# Patient Record
Sex: MALE | Race: WHITE | Employment: FULL TIME | URBAN - METROPOLITAN AREA
[De-identification: names, ages, dates, MRNs, and addresses within clinical notes are randomized per-mention and may not be internally consistent; named-entity substitution may affect disease eponyms.]

---

## 2020-08-03 ENCOUNTER — APPOINTMENT (OUTPATIENT)
Dept: GENERAL RADIOLOGY | Facility: CLINIC | Age: 55
End: 2020-08-03
Attending: EMERGENCY MEDICINE
Payer: COMMERCIAL

## 2020-08-03 ENCOUNTER — HOSPITAL ENCOUNTER (EMERGENCY)
Facility: CLINIC | Age: 55
Discharge: HOME | End: 2020-08-03
Attending: EMERGENCY MEDICINE | Admitting: EMERGENCY MEDICINE
Payer: COMMERCIAL

## 2020-08-03 VITALS
HEART RATE: 71 BPM | WEIGHT: 196.21 LBS | DIASTOLIC BLOOD PRESSURE: 105 MMHG | RESPIRATION RATE: 16 BRPM | SYSTOLIC BLOOD PRESSURE: 156 MMHG | HEIGHT: 71 IN | TEMPERATURE: 98.9 F | BODY MASS INDEX: 27.47 KG/M2 | OXYGEN SATURATION: 100 %

## 2020-08-03 DIAGNOSIS — R20.2 BILATERAL NUMBNESS AND TINGLING OF ARMS AND LEGS: ICD-10-CM

## 2020-08-03 DIAGNOSIS — R20.2 PARESTHESIAS: Primary | ICD-10-CM

## 2020-08-03 DIAGNOSIS — R20.0 BILATERAL NUMBNESS AND TINGLING OF ARMS AND LEGS: ICD-10-CM

## 2020-08-03 DIAGNOSIS — F41.9 ANXIETY: ICD-10-CM

## 2020-08-03 LAB
ANION GAP SERPL CALCULATED.3IONS-SCNC: 7 MMOL/L (ref 3–14)
BASOPHILS # BLD AUTO: 0.1 10E9/L (ref 0–0.2)
BASOPHILS NFR BLD AUTO: 0.5 %
BUN SERPL-MCNC: 16 MG/DL (ref 7–30)
CALCIUM SERPL-MCNC: 8.9 MG/DL (ref 8.5–10.1)
CHLORIDE SERPL-SCNC: 106 MMOL/L (ref 94–109)
CO2 SERPL-SCNC: 26 MMOL/L (ref 20–32)
CREAT SERPL-MCNC: 0.98 MG/DL (ref 0.66–1.25)
D DIMER PPP FEU-MCNC: 0.3 UG/ML FEU (ref 0–0.5)
DIFFERENTIAL METHOD BLD: ABNORMAL
EOSINOPHIL # BLD AUTO: 0 10E9/L (ref 0–0.7)
EOSINOPHIL NFR BLD AUTO: 0.4 %
ERYTHROCYTE [DISTWIDTH] IN BLOOD BY AUTOMATED COUNT: 12.5 % (ref 10–15)
GFR SERPL CREATININE-BSD FRML MDRD: 87 ML/MIN/{1.73_M2}
GLUCOSE SERPL-MCNC: 100 MG/DL (ref 70–99)
HCT VFR BLD AUTO: 42.7 % (ref 40–53)
HGB BLD-MCNC: 14.8 G/DL (ref 13.3–17.7)
IMM GRANULOCYTES # BLD: 0 10E9/L (ref 0–0.4)
IMM GRANULOCYTES NFR BLD: 0.3 %
INTERPRETATION ECG - MUSE: NORMAL
LYMPHOCYTES # BLD AUTO: 1.5 10E9/L (ref 0.8–5.3)
LYMPHOCYTES NFR BLD AUTO: 13.8 %
MCH RBC QN AUTO: 30.1 PG (ref 26.5–33)
MCHC RBC AUTO-ENTMCNC: 34.7 G/DL (ref 31.5–36.5)
MCV RBC AUTO: 87 FL (ref 78–100)
MONOCYTES # BLD AUTO: 1.4 10E9/L (ref 0–1.3)
MONOCYTES NFR BLD AUTO: 12.8 %
NEUTROPHILS # BLD AUTO: 8.1 10E9/L (ref 1.6–8.3)
NEUTROPHILS NFR BLD AUTO: 72.2 %
NRBC # BLD AUTO: 0 10*3/UL
NRBC BLD AUTO-RTO: 0 /100
PLATELET # BLD AUTO: 266 10E9/L (ref 150–450)
POTASSIUM SERPL-SCNC: 3.9 MMOL/L (ref 3.4–5.3)
RBC # BLD AUTO: 4.92 10E12/L (ref 4.4–5.9)
SODIUM SERPL-SCNC: 139 MMOL/L (ref 133–144)
TROPONIN I SERPL-MCNC: <0.015 UG/L (ref 0–0.04)
TSH SERPL DL<=0.005 MIU/L-ACNC: 1.08 MU/L (ref 0.4–4)
WBC # BLD AUTO: 11.2 10E9/L (ref 4–11)

## 2020-08-03 PROCEDURE — 25800030 ZZH RX IP 258 OP 636: Performed by: EMERGENCY MEDICINE

## 2020-08-03 PROCEDURE — 85379 FIBRIN DEGRADATION QUANT: CPT | Performed by: EMERGENCY MEDICINE

## 2020-08-03 PROCEDURE — 84484 ASSAY OF TROPONIN QUANT: CPT | Performed by: EMERGENCY MEDICINE

## 2020-08-03 PROCEDURE — 80048 BASIC METABOLIC PNL TOTAL CA: CPT | Performed by: EMERGENCY MEDICINE

## 2020-08-03 PROCEDURE — 96365 THER/PROPH/DIAG IV INF INIT: CPT

## 2020-08-03 PROCEDURE — 99285 EMERGENCY DEPT VISIT HI MDM: CPT | Mod: 25

## 2020-08-03 PROCEDURE — 84443 ASSAY THYROID STIM HORMONE: CPT | Performed by: EMERGENCY MEDICINE

## 2020-08-03 PROCEDURE — 85025 COMPLETE CBC W/AUTO DIFF WBC: CPT | Performed by: EMERGENCY MEDICINE

## 2020-08-03 PROCEDURE — 93005 ELECTROCARDIOGRAM TRACING: CPT

## 2020-08-03 PROCEDURE — 71046 X-RAY EXAM CHEST 2 VIEWS: CPT

## 2020-08-03 RX ORDER — HYDROXYZINE HYDROCHLORIDE 25 MG/1
50 TABLET, FILM COATED ORAL EVERY 8 HOURS PRN
Qty: 30 TABLET | Refills: 0 | Status: SHIPPED | OUTPATIENT
Start: 2020-08-03

## 2020-08-03 RX ADMIN — SODIUM CHLORIDE 1000 ML: 9 INJECTION, SOLUTION INTRAVENOUS at 14:46

## 2020-08-03 ASSESSMENT — ENCOUNTER SYMPTOMS
ABDOMINAL PAIN: 0
CONSTITUTIONAL NEGATIVE: 1
WEAKNESS: 0
SHORTNESS OF BREATH: 0
BACK PAIN: 0
FEVER: 0
VOMITING: 0
DIARRHEA: 0
HALLUCINATIONS: 0

## 2020-08-03 ASSESSMENT — MIFFLIN-ST. JEOR: SCORE: 1757.13

## 2020-08-03 NOTE — ED AVS SNAPSHOT
Emergency Department  [address] [place] [id number]  Phone:  [phone]  Fax:  [phone]                                    Americo Gomez   MRN: [id number]    Department:   Emergency Department   Date of Visit:  8/3/2020           After Visit Summary Signature Page    I have received my discharge instructions, and my questions have been answered. I have discussed any challenges I see with this plan with the nurse or doctor.    ..........................................................................................................................................  Patient/Patient Representative Signature      ..........................................................................................................................................  Patient Representative Print Name and Relationship to Patient    ..................................................               ................................................  Date                                   Time    ..........................................................................................................................................  Reviewed by Signature/Title    ...................................................              ..............................................  Date                                               Time          22EPIC Rev 08/18

## 2020-08-03 NOTE — ED NOTES
Report received. Patient visualized. Vital signs stable. Patient to Xray. Will continue to monitor

## 2020-08-03 NOTE — ED TRIAGE NOTES
Feeling very anxious last few days, been drinking moderate amount of alcohol over the weekend until yesterday. Felt shortness of breath and bilateral leg numbness and tingling this morning.  Has history of anxiety in the past but no longer on antianxiety medications.

## 2020-08-03 NOTE — ED PROVIDER NOTES
History     Chief Complaint:  Anxiety and Alcohol Problem    HPI  Americo Gomez is a 54 year old male with a history of alcoholism and hypercholesterolemia who presents with bilateral arm tingling which started at noon today.  Patient notes he was drinking heavily this weekend with family and friends and stopped drinking yesterday.  He denies feeling symptoms of alcohol withdrawal but does seem a little tremulous on initial intake.  He denies chest pain and shortness of breath but notes that he was quite anxious and concerned about this bilateral arm tingling which started suddenly around noon today.  He denies weakness in his upper extremities.  He denies headache and vision changes.  Denies fever and constitutional symptoms.  Denies cough and/or shortness of breath.  Patient denies suicidal and homicidal thoughts.  He does state he used to be on a statin medication, but had muscle soreness due to this and this was discontinued.  He does not have a history of high blood pressure.  He states his father did have a heart attack at the age of 56.      Allergies:  No known drug allergies    Medications:    Lipitor    Past Medical History:    Anxiety  HTN  Colon polyps    Past Surgical History:    Appendectomy     Family History:    History reviewed. No pertinent family history.     Social History:  The patient arrives alone  Smoking: former  Alcohol use: yes  Marital Status:  [2]      Review of Systems   Constitutional: Negative.  Negative for fever.   Respiratory: Negative for shortness of breath.    Cardiovascular: Negative for chest pain.   Gastrointestinal: Negative for abdominal pain, diarrhea and vomiting.   Musculoskeletal: Negative for back pain.   Neurological: Negative for weakness.        Tingling to bilateral upper extremities   Psychiatric/Behavioral: Negative for hallucinations and suicidal ideas.        Anxious   All other systems reviewed and are negative.      Physical Exam     Patient Vitals for  "the past 24 hrs:   BP Temp Temp src Pulse Resp SpO2 Height Weight   08/03/20 1522 -- -- -- 71 -- 100 % -- --   08/03/20 1515 (!) 156/105 -- -- -- -- -- -- --   08/03/20 1445 (!) 142/101 -- -- 68 -- 97 % -- --   08/03/20 1345 (!) 165/106 -- -- 74 -- 98 % -- --   08/03/20 1253 (!) 164/94 98.9  F (37.2  C) Oral 72 16 100 % 1.803 m (5' 11\") 89 kg (196 lb 3.4 oz)     Physical Exam  General: Alert, appears well-developed and well-nourished. Cooperative.     In mild distress  HEENT:  Head:  Atraumatic  Ears:  External ears are normal  Mouth/Throat:  Oropharynx is without erythema or exudate and mucous membranes are moist.   Eyes:   Conjunctivae normal and EOM are normal. No scleral icterus.  CV:  Normal rate, regular rhythm, normal heart sounds and radial pulses are 2+ and symmetric.  No murmur.  Resp:  Breath sounds are clear bilaterally    Non-labored, no retractions or accessory muscle use  GI:  Abdomen is soft, no distension, no tenderness. No rebound or guarding.  No CVA tenderness bilaterally  MS:  Normal range of motion. No edema.    Normal strength in all 4 extremities.     Back atraumatic.    No midline cervical, thoracic, or lumbar tenderness  Skin:  Warm and dry.  No rash or lesions noted.  Neuro: Alert. Normal strength.  Sensation intact in all 4 extremities. GCS: 15  Psych:  Normal mood and affect.    Emergency Department Course     ECG:  ECG taken at 1401, ECG read at 1407  Normal sinus rhythm  Normal ECG  Rate 62 bpm. NV interval 146 ms. QRS duration 88 ms. QT/QTc 406/412 ms. P-R-T axes 56 -9 29.    Imaging:  Radiology findings were communicated with the patient who voiced understanding of the findings.    XR Chest 2 views   Heart is normal in size. Lungs are clear.   Reading per radiology    Laboratory:  Laboratory findings were communicated with the patient who voiced understanding of the findings.    CBC: (WBC 11.2 (H), HGB 14.8, )   BMP: Glucose 100 (H), o/w WNL (Creatinine: 0.98)    D-dimer: " 0.3  Troponin (Collected 1448): <0.015    TSH with free T4 reflex: 1.08    Interventions:  1446 0.9% NaCl bolus 1000 mL IV    Emergency Department Course:  Past medical records, nursing notes, and vitals reviewed.  1405: I performed an exam of the patient and obtained history, as documented above.      IV was inserted and blood was drawn for laboratory testing, results above.   The patient was sent for a XR chest while in the emergency department, findings above    1555: I rechecked the patient. Explained findings to patient.    I personally reviewed the laboratory and imaging results with the Patient and answered all related questions prior to discharge.     Findings and plan explained to the Patient. Patient discharged home with instructions regarding supportive care, medications, and reasons to return. The importance of close follow-up was reviewed.  Impression & Plan      Medical Decision Making:  Americo Gomez is a 54-year-old male with a history of anxiety and alcohol abuse who presents with bilateral arm tingling and anxiety which started at approximately noon today.  Patient denies active chest pain but did note a brief episode of chest discomfort associated with the tingling.  He felt very uncomfortable when this started but seemed improved here.  Patient notes he drank heavily this weekend but denies any recent alcohol use today.  Patient's blood work is grossly unremarkable with normal renal function and electrolytes.  EKG shows no concerning ischemic changes and troponin is undetectable, lower concern for ACS particularly in the setting of no active chest pain.  Patient's d-dimer within normal limits and lower concern for pulmonary embolism.  Chest x-ray was obtained, reassuringly showed no evidence of spontaneous pneumothorax or focal infiltrate.  He has no infectious symptoms and lower concern for infectious processes.  Patient does need to reestablish with a primary care provider as he does appear to  have hypertension that would benefit from antihypertensive medications.  Additionally he seems to have anxiety that is untreated and we will trial some Atarax while we wait to have the patient reestablish both with primary care and potentially with psychiatry.  Thankfully he has no suicidal or homicidal ideations and I do not feel that he requires emergent DEC assessment today.  Patient has no focal neurologic deficits and lower concern for stroke.  Unclear to the exact etiology of the patient's paresthesias, but no indication for advanced MRI or CT imaging of the head or brain today.  In discussion with patient he is feeling improved and feels comfortable with outpatient follow-up with primary care.  After all questions answered return precautions understood, discharged home.    Diagnosis:    ICD-10-CM    1. Paresthesias  R20.2    2. Anxiety  F41.9 MENTAL HEALTH REFERRAL  - Adult; Psychiatry; Psychiatry; [facility]: Psychiatry Clinic [phone]; We will contact you to schedule the appointment or please call with any questions   3. Bilateral numbness and tingling of arms and legs  R20.0     R20.2        Disposition:   Discharged to home.    Discharge Medications:     Medication List      Started    hydrOXYzine 25 MG tablet  Commonly known as:  ATARAX  50 mg, Oral, EVERY 8 HOURS PRN            Scribe Disclosure:  MATTHIEU, Michaela Jones, am serving as a scribe at 2:25 PM on 8/3/2020 to document services personally performed by Joel Soares MD based on my observations and the provider's statements to me.     EMERGENCY DEPARTMENT     Joel Soares MD  08/03/20 3294

## 2020-10-01 ENCOUNTER — APPOINTMENT (OUTPATIENT)
Dept: URBAN - METROPOLITAN AREA CLINIC 256 | Age: 55
Setting detail: DERMATOLOGY
End: 2020-10-01

## 2020-10-01 VITALS — HEIGHT: 71 IN | RESPIRATION RATE: 15 BRPM | WEIGHT: 195 LBS

## 2020-10-01 DIAGNOSIS — L73.8 OTHER SPECIFIED FOLLICULAR DISORDERS: ICD-10-CM

## 2020-10-01 DIAGNOSIS — L57.8 OTHER SKIN CHANGES DUE TO CHRONIC EXPOSURE TO NONIONIZING RADIATION: ICD-10-CM

## 2020-10-01 DIAGNOSIS — D18.0 HEMANGIOMA: ICD-10-CM

## 2020-10-01 DIAGNOSIS — L82.1 OTHER SEBORRHEIC KERATOSIS: ICD-10-CM

## 2020-10-01 DIAGNOSIS — D22 MELANOCYTIC NEVI: ICD-10-CM

## 2020-10-01 DIAGNOSIS — L81.4 OTHER MELANIN HYPERPIGMENTATION: ICD-10-CM

## 2020-10-01 PROBLEM — D22.5 MELANOCYTIC NEVI OF TRUNK: Status: ACTIVE | Noted: 2020-10-01

## 2020-10-01 PROBLEM — D18.01 HEMANGIOMA OF SKIN AND SUBCUTANEOUS TISSUE: Status: ACTIVE | Noted: 2020-10-01

## 2020-10-01 PROCEDURE — 99203 OFFICE O/P NEW LOW 30 MIN: CPT

## 2020-10-01 PROCEDURE — OTHER COUNSELING: OTHER

## 2020-10-01 PROCEDURE — OTHER REASSURANCE: OTHER

## 2020-10-01 ASSESSMENT — LOCATION DETAILED DESCRIPTION DERM
LOCATION DETAILED: SUPERIOR THORACIC SPINE
LOCATION DETAILED: LEFT MEDIAL UPPER BACK
LOCATION DETAILED: RIGHT SUPERIOR MEDIAL UPPER BACK
LOCATION DETAILED: RIGHT MEDIAL UPPER BACK
LOCATION DETAILED: INFERIOR MID FOREHEAD

## 2020-10-01 ASSESSMENT — LOCATION SIMPLE DESCRIPTION DERM
LOCATION SIMPLE: LEFT UPPER BACK
LOCATION SIMPLE: RIGHT UPPER BACK
LOCATION SIMPLE: INFERIOR FOREHEAD
LOCATION SIMPLE: UPPER BACK

## 2020-10-01 ASSESSMENT — LOCATION ZONE DERM
LOCATION ZONE: TRUNK
LOCATION ZONE: FACE